# Patient Record
Sex: MALE | Race: BLACK OR AFRICAN AMERICAN | NOT HISPANIC OR LATINO | URBAN - METROPOLITAN AREA
[De-identification: names, ages, dates, MRNs, and addresses within clinical notes are randomized per-mention and may not be internally consistent; named-entity substitution may affect disease eponyms.]

---

## 2020-01-03 ENCOUNTER — EMERGENCY (EMERGENCY)
Facility: HOSPITAL | Age: 45
LOS: 0 days | Discharge: ANOTHER HOSPITAL | End: 2020-01-04
Attending: EMERGENCY MEDICINE
Payer: SELF-PAY

## 2020-01-03 DIAGNOSIS — R07.89 OTHER CHEST PAIN: ICD-10-CM

## 2020-01-03 DIAGNOSIS — J94.2 HEMOTHORAX: ICD-10-CM

## 2020-01-03 PROCEDURE — 93010 ELECTROCARDIOGRAM REPORT: CPT

## 2020-01-03 PROCEDURE — 99285 EMERGENCY DEPT VISIT HI MDM: CPT | Mod: 25

## 2020-01-03 PROCEDURE — 99053 MED SERV 10PM-8AM 24 HR FAC: CPT

## 2020-01-03 PROCEDURE — 32551 INSERTION OF CHEST TUBE: CPT

## 2020-01-04 ENCOUNTER — INPATIENT (INPATIENT)
Facility: HOSPITAL | Age: 45
LOS: 1 days | Discharge: HOME | End: 2020-01-06
Attending: THORACIC SURGERY (CARDIOTHORACIC VASCULAR SURGERY) | Admitting: THORACIC SURGERY (CARDIOTHORACIC VASCULAR SURGERY)
Payer: SELF-PAY

## 2020-01-04 VITALS
SYSTOLIC BLOOD PRESSURE: 135 MMHG | TEMPERATURE: 98 F | HEIGHT: 76 IN | HEART RATE: 87 BPM | OXYGEN SATURATION: 95 % | RESPIRATION RATE: 18 BRPM | DIASTOLIC BLOOD PRESSURE: 96 MMHG | WEIGHT: 166.89 LBS

## 2020-01-04 VITALS
DIASTOLIC BLOOD PRESSURE: 77 MMHG | RESPIRATION RATE: 14 BRPM | SYSTOLIC BLOOD PRESSURE: 124 MMHG | OXYGEN SATURATION: 99 % | HEART RATE: 76 BPM

## 2020-01-04 VITALS
OXYGEN SATURATION: 97 % | HEART RATE: 79 BPM | RESPIRATION RATE: 20 BRPM | SYSTOLIC BLOOD PRESSURE: 136 MMHG | DIASTOLIC BLOOD PRESSURE: 86 MMHG | TEMPERATURE: 98 F

## 2020-01-04 DIAGNOSIS — J93.9 PNEUMOTHORAX, UNSPECIFIED: ICD-10-CM

## 2020-01-04 LAB
ALBUMIN SERPL ELPH-MCNC: 3.4 G/DL — SIGNIFICANT CHANGE UP (ref 3.3–5)
ALBUMIN SERPL ELPH-MCNC: 3.9 G/DL — SIGNIFICANT CHANGE UP (ref 3.3–5)
ALP SERPL-CCNC: 52 U/L — SIGNIFICANT CHANGE UP (ref 40–120)
ALP SERPL-CCNC: 54 U/L — SIGNIFICANT CHANGE UP (ref 40–120)
ALT FLD-CCNC: 10 U/L — SIGNIFICANT CHANGE UP (ref 4–41)
ALT FLD-CCNC: 16 U/L — SIGNIFICANT CHANGE UP (ref 12–78)
ANION GAP SERPL CALC-SCNC: 13 MMO/L — SIGNIFICANT CHANGE UP (ref 7–14)
ANION GAP SERPL CALC-SCNC: 5 MMOL/L — SIGNIFICANT CHANGE UP (ref 5–17)
APTT BLD: 30.8 SEC — SIGNIFICANT CHANGE UP (ref 27.5–36.3)
APTT BLD: 32.6 SEC — SIGNIFICANT CHANGE UP (ref 27.5–36.3)
AST SERPL-CCNC: 15 U/L — SIGNIFICANT CHANGE UP (ref 15–37)
AST SERPL-CCNC: 28 U/L — SIGNIFICANT CHANGE UP (ref 4–40)
BASOPHILS # BLD AUTO: 0.04 K/UL — SIGNIFICANT CHANGE UP (ref 0–0.2)
BASOPHILS # BLD AUTO: 0.06 K/UL — SIGNIFICANT CHANGE UP (ref 0–0.2)
BASOPHILS NFR BLD AUTO: 0.3 % — SIGNIFICANT CHANGE UP (ref 0–2)
BASOPHILS NFR BLD AUTO: 0.7 % — SIGNIFICANT CHANGE UP (ref 0–2)
BILIRUB SERPL-MCNC: 0.6 MG/DL — SIGNIFICANT CHANGE UP (ref 0.2–1.2)
BILIRUB SERPL-MCNC: 0.7 MG/DL — SIGNIFICANT CHANGE UP (ref 0.2–1.2)
BUN SERPL-MCNC: 15 MG/DL — SIGNIFICANT CHANGE UP (ref 7–23)
BUN SERPL-MCNC: 17 MG/DL — SIGNIFICANT CHANGE UP (ref 7–23)
CALCIUM SERPL-MCNC: 8.7 MG/DL — SIGNIFICANT CHANGE UP (ref 8.5–10.1)
CALCIUM SERPL-MCNC: 8.9 MG/DL — SIGNIFICANT CHANGE UP (ref 8.4–10.5)
CHLORIDE SERPL-SCNC: 102 MMOL/L — SIGNIFICANT CHANGE UP (ref 98–107)
CHLORIDE SERPL-SCNC: 106 MMOL/L — SIGNIFICANT CHANGE UP (ref 96–108)
CO2 SERPL-SCNC: 23 MMOL/L — SIGNIFICANT CHANGE UP (ref 22–31)
CO2 SERPL-SCNC: 27 MMOL/L — SIGNIFICANT CHANGE UP (ref 22–31)
CREAT SERPL-MCNC: 1.05 MG/DL — SIGNIFICANT CHANGE UP (ref 0.5–1.3)
CREAT SERPL-MCNC: 1.14 MG/DL — SIGNIFICANT CHANGE UP (ref 0.5–1.3)
D DIMER BLD IA.RAPID-MCNC: 177 NG/ML DDU — SIGNIFICANT CHANGE UP
EOSINOPHIL # BLD AUTO: 0.1 K/UL — SIGNIFICANT CHANGE UP (ref 0–0.5)
EOSINOPHIL # BLD AUTO: 0.18 K/UL — SIGNIFICANT CHANGE UP (ref 0–0.5)
EOSINOPHIL NFR BLD AUTO: 0.7 % — SIGNIFICANT CHANGE UP (ref 0–6)
EOSINOPHIL NFR BLD AUTO: 2 % — SIGNIFICANT CHANGE UP (ref 0–6)
GLUCOSE SERPL-MCNC: 70 MG/DL — SIGNIFICANT CHANGE UP (ref 70–99)
GLUCOSE SERPL-MCNC: 88 MG/DL — SIGNIFICANT CHANGE UP (ref 70–99)
HCT VFR BLD CALC: 37.6 % — LOW (ref 39–50)
HCT VFR BLD CALC: 43.1 % — SIGNIFICANT CHANGE UP (ref 39–50)
HGB BLD-MCNC: 12.6 G/DL — LOW (ref 13–17)
HGB BLD-MCNC: 13.9 G/DL — SIGNIFICANT CHANGE UP (ref 13–17)
IMM GRANULOCYTES NFR BLD AUTO: 0.2 % — SIGNIFICANT CHANGE UP (ref 0–1.5)
IMM GRANULOCYTES NFR BLD AUTO: 0.2 % — SIGNIFICANT CHANGE UP (ref 0–1.5)
INR BLD: 1.14 RATIO — SIGNIFICANT CHANGE UP (ref 0.88–1.16)
INR BLD: 1.14 — SIGNIFICANT CHANGE UP (ref 0.88–1.17)
LYMPHOCYTES # BLD AUTO: 1.62 K/UL — SIGNIFICANT CHANGE UP (ref 1–3.3)
LYMPHOCYTES # BLD AUTO: 11.4 % — LOW (ref 13–44)
LYMPHOCYTES # BLD AUTO: 2.88 K/UL — SIGNIFICANT CHANGE UP (ref 1–3.3)
LYMPHOCYTES # BLD AUTO: 32.6 % — SIGNIFICANT CHANGE UP (ref 13–44)
MCHC RBC-ENTMCNC: 27.7 PG — SIGNIFICANT CHANGE UP (ref 27–34)
MCHC RBC-ENTMCNC: 28.5 PG — SIGNIFICANT CHANGE UP (ref 27–34)
MCHC RBC-ENTMCNC: 32.3 % — SIGNIFICANT CHANGE UP (ref 32–36)
MCHC RBC-ENTMCNC: 33.5 GM/DL — SIGNIFICANT CHANGE UP (ref 32–36)
MCV RBC AUTO: 85.1 FL — SIGNIFICANT CHANGE UP (ref 80–100)
MCV RBC AUTO: 86 FL — SIGNIFICANT CHANGE UP (ref 80–100)
MONOCYTES # BLD AUTO: 1.03 K/UL — HIGH (ref 0–0.9)
MONOCYTES # BLD AUTO: 1.4 K/UL — HIGH (ref 0–0.9)
MONOCYTES NFR BLD AUTO: 15.8 % — HIGH (ref 2–14)
MONOCYTES NFR BLD AUTO: 7.2 % — SIGNIFICANT CHANGE UP (ref 2–14)
NEUTROPHILS # BLD AUTO: 11.41 K/UL — HIGH (ref 1.8–7.4)
NEUTROPHILS # BLD AUTO: 4.3 K/UL — SIGNIFICANT CHANGE UP (ref 1.8–7.4)
NEUTROPHILS NFR BLD AUTO: 48.7 % — SIGNIFICANT CHANGE UP (ref 43–77)
NEUTROPHILS NFR BLD AUTO: 80.2 % — HIGH (ref 43–77)
NRBC # BLD: 0 /100 WBCS — SIGNIFICANT CHANGE UP (ref 0–0)
NRBC # FLD: 0 K/UL — SIGNIFICANT CHANGE UP (ref 0–0)
PLATELET # BLD AUTO: 167 K/UL — SIGNIFICANT CHANGE UP (ref 150–400)
PLATELET # BLD AUTO: 171 K/UL — SIGNIFICANT CHANGE UP (ref 150–400)
PMV BLD: 12.1 FL — SIGNIFICANT CHANGE UP (ref 7–13)
POTASSIUM SERPL-MCNC: 4.2 MMOL/L — SIGNIFICANT CHANGE UP (ref 3.5–5.3)
POTASSIUM SERPL-MCNC: 4.7 MMOL/L — SIGNIFICANT CHANGE UP (ref 3.5–5.3)
POTASSIUM SERPL-SCNC: 4.2 MMOL/L — SIGNIFICANT CHANGE UP (ref 3.5–5.3)
POTASSIUM SERPL-SCNC: 4.7 MMOL/L — SIGNIFICANT CHANGE UP (ref 3.5–5.3)
PROT SERPL-MCNC: 7.2 GM/DL — SIGNIFICANT CHANGE UP (ref 6–8.3)
PROT SERPL-MCNC: 7.3 G/DL — SIGNIFICANT CHANGE UP (ref 6–8.3)
PROTHROM AB SERPL-ACNC: 12.7 SEC — SIGNIFICANT CHANGE UP (ref 9.8–13.1)
PROTHROM AB SERPL-ACNC: 12.8 SEC — SIGNIFICANT CHANGE UP (ref 10–12.9)
RBC # BLD: 4.42 M/UL — SIGNIFICANT CHANGE UP (ref 4.2–5.8)
RBC # BLD: 5.01 M/UL — SIGNIFICANT CHANGE UP (ref 4.2–5.8)
RBC # FLD: 13.1 % — SIGNIFICANT CHANGE UP (ref 10.3–14.5)
RBC # FLD: 13.2 % — SIGNIFICANT CHANGE UP (ref 10.3–14.5)
SODIUM SERPL-SCNC: 138 MMOL/L — SIGNIFICANT CHANGE UP (ref 135–145)
SODIUM SERPL-SCNC: 138 MMOL/L — SIGNIFICANT CHANGE UP (ref 135–145)
TROPONIN I SERPL-MCNC: <.015 NG/ML — SIGNIFICANT CHANGE UP (ref 0.01–0.04)
WBC # BLD: 14.23 K/UL — HIGH (ref 3.8–10.5)
WBC # BLD: 8.84 K/UL — SIGNIFICANT CHANGE UP (ref 3.8–10.5)
WBC # FLD AUTO: 14.23 K/UL — HIGH (ref 3.8–10.5)
WBC # FLD AUTO: 8.84 K/UL — SIGNIFICANT CHANGE UP (ref 3.8–10.5)

## 2020-01-04 PROCEDURE — 99223 1ST HOSP IP/OBS HIGH 75: CPT

## 2020-01-04 PROCEDURE — 71045 X-RAY EXAM CHEST 1 VIEW: CPT | Mod: 26,76

## 2020-01-04 PROCEDURE — 71275 CT ANGIOGRAPHY CHEST: CPT | Mod: 26

## 2020-01-04 PROCEDURE — 71045 X-RAY EXAM CHEST 1 VIEW: CPT | Mod: 26,77

## 2020-01-04 RX ORDER — PANTOPRAZOLE SODIUM 20 MG/1
40 TABLET, DELAYED RELEASE ORAL
Refills: 0 | Status: DISCONTINUED | OUTPATIENT
Start: 2020-01-04 | End: 2020-01-06

## 2020-01-04 RX ORDER — OXYCODONE HYDROCHLORIDE 5 MG/1
10 TABLET ORAL EVERY 4 HOURS
Refills: 0 | Status: DISCONTINUED | OUTPATIENT
Start: 2020-01-04 | End: 2020-01-06

## 2020-01-04 RX ORDER — SENNA PLUS 8.6 MG/1
2 TABLET ORAL AT BEDTIME
Refills: 0 | Status: DISCONTINUED | OUTPATIENT
Start: 2020-01-04 | End: 2020-01-06

## 2020-01-04 RX ORDER — OXYCODONE HYDROCHLORIDE 5 MG/1
5 TABLET ORAL EVERY 4 HOURS
Refills: 0 | Status: DISCONTINUED | OUTPATIENT
Start: 2020-01-04 | End: 2020-01-06

## 2020-01-04 RX ORDER — HEPARIN SODIUM 5000 [USP'U]/ML
5000 INJECTION INTRAVENOUS; SUBCUTANEOUS EVERY 8 HOURS
Refills: 0 | Status: DISCONTINUED | OUTPATIENT
Start: 2020-01-04 | End: 2020-01-06

## 2020-01-04 RX ORDER — IBUPROFEN 200 MG
600 TABLET ORAL EVERY 6 HOURS
Refills: 0 | Status: DISCONTINUED | OUTPATIENT
Start: 2020-01-04 | End: 2020-01-04

## 2020-01-04 RX ORDER — ACETAMINOPHEN 500 MG
975 TABLET ORAL EVERY 6 HOURS
Refills: 0 | Status: DISCONTINUED | OUTPATIENT
Start: 2020-01-04 | End: 2020-01-06

## 2020-01-04 RX ORDER — METHOCARBAMOL 500 MG/1
750 TABLET, FILM COATED ORAL ONCE
Refills: 0 | Status: COMPLETED | OUTPATIENT
Start: 2020-01-04 | End: 2020-01-04

## 2020-01-04 RX ORDER — INFLUENZA VIRUS VACCINE 15; 15; 15; 15 UG/.5ML; UG/.5ML; UG/.5ML; UG/.5ML
0.5 SUSPENSION INTRAMUSCULAR ONCE
Refills: 0 | Status: DISCONTINUED | OUTPATIENT
Start: 2020-01-04 | End: 2020-01-06

## 2020-01-04 RX ORDER — MORPHINE SULFATE 50 MG/1
4 CAPSULE, EXTENDED RELEASE ORAL ONCE
Refills: 0 | Status: DISCONTINUED | OUTPATIENT
Start: 2020-01-04 | End: 2020-01-04

## 2020-01-04 RX ORDER — KETOROLAC TROMETHAMINE 30 MG/ML
15 SYRINGE (ML) INJECTION ONCE
Refills: 0 | Status: DISCONTINUED | OUTPATIENT
Start: 2020-01-04 | End: 2020-01-04

## 2020-01-04 RX ORDER — FENTANYL CITRATE 50 UG/ML
50 INJECTION INTRAVENOUS ONCE
Refills: 0 | Status: DISCONTINUED | OUTPATIENT
Start: 2020-01-04 | End: 2020-01-04

## 2020-01-04 RX ORDER — SODIUM CHLORIDE 9 MG/ML
1000 INJECTION INTRAMUSCULAR; INTRAVENOUS; SUBCUTANEOUS ONCE
Refills: 0 | Status: COMPLETED | OUTPATIENT
Start: 2020-01-04 | End: 2020-01-04

## 2020-01-04 RX ADMIN — HEPARIN SODIUM 5000 UNIT(S): 5000 INJECTION INTRAVENOUS; SUBCUTANEOUS at 15:00

## 2020-01-04 RX ADMIN — FENTANYL CITRATE 50 MICROGRAM(S): 50 INJECTION INTRAVENOUS at 06:10

## 2020-01-04 RX ADMIN — Medication 15 MILLIGRAM(S): at 01:58

## 2020-01-04 RX ADMIN — MORPHINE SULFATE 4 MILLIGRAM(S): 50 CAPSULE, EXTENDED RELEASE ORAL at 10:42

## 2020-01-04 RX ADMIN — SODIUM CHLORIDE 1000 MILLILITER(S): 9 INJECTION INTRAMUSCULAR; INTRAVENOUS; SUBCUTANEOUS at 06:15

## 2020-01-04 RX ADMIN — METHOCARBAMOL 750 MILLIGRAM(S): 500 TABLET, FILM COATED ORAL at 01:58

## 2020-01-04 RX ADMIN — SODIUM CHLORIDE 1000 MILLILITER(S): 9 INJECTION INTRAMUSCULAR; INTRAVENOUS; SUBCUTANEOUS at 07:04

## 2020-01-04 RX ADMIN — OXYCODONE HYDROCHLORIDE 10 MILLIGRAM(S): 5 TABLET ORAL at 23:49

## 2020-01-04 RX ADMIN — MORPHINE SULFATE 4 MILLIGRAM(S): 50 CAPSULE, EXTENDED RELEASE ORAL at 07:04

## 2020-01-04 RX ADMIN — FENTANYL CITRATE 50 MICROGRAM(S): 50 INJECTION INTRAVENOUS at 07:04

## 2020-01-04 RX ADMIN — Medication 15 MILLIGRAM(S): at 02:28

## 2020-01-04 RX ADMIN — OXYCODONE HYDROCHLORIDE 10 MILLIGRAM(S): 5 TABLET ORAL at 22:49

## 2020-01-04 RX ADMIN — HEPARIN SODIUM 5000 UNIT(S): 5000 INJECTION INTRAVENOUS; SUBCUTANEOUS at 21:43

## 2020-01-04 RX ADMIN — MORPHINE SULFATE 4 MILLIGRAM(S): 50 CAPSULE, EXTENDED RELEASE ORAL at 06:25

## 2020-01-04 NOTE — H&P ADULT - NSHPREVIEWOFSYSTEMS_GEN_ALL_CORE
REVIEW OF SYSTEMS:  CONSTITUTIONAL: No fever, weight loss, or fatigue  EYES: No eye pain, visual disturbances, or discharge  ENMT:  No tinnitus, vertigo, dysphagia  RESPIRATORY: No cough, SOB, wheezing, chills or hemoptysis  CARDIOVASCULAR: Having pleuritic chest pain. No palpitations, dizziness, or leg swelling  GASTROINTESTINAL: No abdominal pain. No nausea, vomiting, or diarrhea.  GENITOURINARY: No dysuria, frequency, hematuria, or incontinence.  NEUROLOGICAL: No headaches, memory loss, loss of strength, numbness, or tremors  SKIN: No itching, burning, rashes, or lesions   ENDOCRINE: No heat or cold intolerance  MUSCULOSKELETAL: No joint pain or swelling

## 2020-01-04 NOTE — H&P ADULT - ASSESSMENT
45 y/o male, no significant medical history besides current every day smoker, presents from [facility] to ED transfer for management of left pneumothorax. His tube is to -20 suction with no air leak and pain has improved since insertion. He hasn't been SOB. dizzy, or lightheaded. His labs show leukocytosis but probably elevated in setting of viral infection. His labs are otherwise within normal limits. His imaging shows small residual pneumothorax but otherwise well expanded lung from previous imaging. He is currently hemodynamically stable and afebrile.       Plan:    -Admit to thoracic surgery per Dr. Monroe  -CT to -20 suction, observe for air leak  -F/u AM CXR   -Regular diet  -Nicotine patch if signs of tobacco withdrawal  on smoking cessation  -DVT ppx: Heparin      Plan discussed with Dr. Monroe

## 2020-01-04 NOTE — ED ADULT NURSE REASSESSMENT NOTE - NS ED NURSE REASSESS COMMENT FT1
Patient was received from [facility]. at 0710, a/o x4 in bed 17 with left sided size 8 chest tube in place and connect to pleuravac drain with low suction. Denies pains or sob, no drainage in tube. Patient is NSR on cardiac monitor. Pending transfer to [facility] ED. Patient and wife Diandra Leahy aware.
chest tube insertion into L-chest wall by dr. Shea

## 2020-01-04 NOTE — ED ADULT TRIAGE NOTE - CHIEF COMPLAINT QUOTE
pt Transferred from [place] for Pneumothorax pt with lefts side Chest tube in place. Fr. 14 to suction, pt stated had cold few days ago and the last 3-4 days have been coughing, denies trauma,   Hx. Smoking.

## 2020-01-04 NOTE — ED PROVIDER NOTE - CLINICAL SUMMARY MEDICAL DECISION MAKING FREE TEXT BOX
Patient w/chest pain, s/p large left sided pneumothorax.  VSS.  Cook catheter 8Fr inserted for lung reexpansion, patient tolerated procedure well.  Patient to be transferred to [facility] for ongoing management by Thoracic surgery.  Patient consents to transfer.

## 2020-01-04 NOTE — H&P ADULT - NSHPPHYSICALEXAM_GEN_ALL_CORE
Gen: NAD, WN/WD  HEENT: No scleral icterus, PERRLA/EOMI, hearing grossly intact  Neck: Supple, trachea midline, no thyromegaly, no JVD  Lung: CTAB, no wheezes/rhonchi/rales  Heart: RRR, no murmurs/rubs/gallops  GI: Soft, non-tender, non-distended  Extrem: WWP, no edema, palpable pulses bilaterally  Chest tube: L CT to -20 suction, no air leak

## 2020-01-04 NOTE — H&P ADULT - NSHPLABSRESULTS_GEN_ALL_CORE
13.9   14.23 )-----------( 167      ( 04 Jan 2020 10:34 )               43.1       01-04    138  |  102  |  17  ----------------------------<  70  4.7   |  23  |  1.14    Ca    8.9      04 Jan 2020 10:34    TPro  7.3  /  Alb  3.9  /  TBili  0.6  /  DBili  x   /  AST  28  /  ALT  10  /  AlkPhos  52  01-04      PT/INR - ( 04 Jan 2020 10:34 )   PT: 12.7 SEC;   INR: 1.14          PTT - ( 04 Jan 2020 10:34 )  PTT:30.8 SEC          RADIOLOGY:    Xray Chest 1 View- PORTABLE-Urgent (01.04.20 @ 11:37)     FINDINGS:    LUNGS: Left chest tube. Patchy left lower lung opacity.    PLEURA: Small left apical pneumothorax. No pleural effusion.    HEART AND MEDIASTINUM: Heart size is within normal limits.    SKELETON: Unremarkable skeletal structures.      IMPRESSION:     Small left apical pneumothorax status post left chest tube.      CT Chest (1/4): Collapsed left lung and right apical blebs. No pleural effusions

## 2020-01-04 NOTE — ED PROVIDER NOTE - OBJECTIVE STATEMENT
Pertinent PMH/PSH/FHx/SHx and Review of Systems contained within:  Patient presents to the ED for chest pain.  Patient reports substernal chest pain for the last 2 days, continuous, nonradiating, pain worse on movement and deep breathing, patient endorses mild sob.  He took ibuprofen at home without relief.  He does endorse recent history of physical labor.  Denies cough, hemoptysis, leg swelling, fever, recent immobility, dizziness, syncope.  +Smoker, denies use of other illict drugs or h/o alchol abuse.  He denies family h/o heart disease.     ROS: No fever/chills, No headache/photophobia/eye pain/changes in vision, No ear pain/sore throat/dysphagia, No palpitations, no SOB/cough/wheeze/stridor, No abdominal pain, No N/V/D/melena, no dysuria/frequency/discharge, No neck/back pain, no rash, no changes in neurological status/function.

## 2020-01-04 NOTE — ED ADULT NURSE NOTE - OBJECTIVE STATEMENT
rec'd pt aaox3 in room 14 as a transfer from [facility] for CT surgery consult, pt. dx w/ a pneumothorax, arrives w/ a 14 Fr chest tube in place, connected to wall suction by EMS at 80 mmHg, 5 mL's of serosanguinous fluid noted in the pleura vac collection chamber, pt. c/o 4/10 discomfort at the chest tube insertion site. Pt. also arrives w/ bilateral 20G IV saline locks in the AC's, no medication/fluids running at the present time. Pt. reports a productive cough w/ yellow sputum x 3 days, subjective fevers, states he developed L sided chest/abdominal pain yesterday, predominantly w/ movement. Denies shortness of breath, appears comfortable, respirations even and unlabored. SPO2 99% on 2L via nasal canula. Labs drawn and sent as ordered. Pt. medicated as ordered. Awaiting results and dispo.

## 2020-01-04 NOTE — ED ADULT NURSE NOTE - OBJECTIVE STATEMENT
pt c/o chest pain x 2 days, worse on movement and on cough.  pt c/o cough with yellow phlegm.  pt denies SOB

## 2020-01-04 NOTE — ED PROVIDER NOTE - CLINICAL SUMMARY MEDICAL DECISION MAKING FREE TEXT BOX
[facility] PGY2- 43 yo M presents with Left lung pneumothorax s/p chest tube placement. Will obtain CXR and basic labs. Anticipate admission.

## 2020-01-04 NOTE — ED PROVIDER NOTE - NS ED ROS FT
CONSTITUTIONAL: No fever, weight loss, or fatigue  EYES: No eye pain, visual disturbances, or discharge  ENMT: No difficulty hearing, tinnitus, vertigo; No sinus or throat pain  RESPIRATORY: + cough, shortness of breath  CARDIOVASCULAR: + chest pain  GASTROINTESTINAL: No abdominal or epigastric pain. No nausea, vomiting, or hematemesis; No diarrhea or constipation. No melena or hematochezia.  GENITOURINARY: No dysuria, frequency, hematuria, or incontinence  NEUROLOGICAL: No headaches, loss of strength, numbness, or tremors  SKIN: No itching, burning, rashes, or lesions   LYMPH NODES: No enlarged glands  ENDOCRINE: No heat or cold intolerance; No polydipsia or polyuria  MUSCULOSKELETAL: No joint pain or swelling;   PSYCHIATRIC: Denies depression, anxiety  HEME/LYMPH: No easy bruising, or bleeding gums  ALLERGY AND IMMUNOLOGIC: No hives or eczema

## 2020-01-04 NOTE — ED PROVIDER NOTE - PHYSICAL EXAMINATION
PHYSICAL EXAM:  GENERAL: NAD  HEAD:  Atraumatic, Normocephalic  EYES: EOMI, conjunctiva and sclera clear  ENMT: No tonsillar erythema, exudates, or enlargement; dry mucous membranes, Good dentition  NECK: Supple, No JVD  NERVOUS SYSTEM: AOX3, motor and sensation grossly intact in b/l UE and b/l LE  PSYCHIATRIC: Appropriate affect and mood  CHEST/LUNG: Diminished Left lower lung breath sounds. Left chest tube in place   HEART: Regular rate and rhythm; No murmurs, rubs, or gallops. No LE edema  ABDOMEN: Soft, Nontender, Nondistended; Bowel sounds present  EXTREMITIES:  2+ Peripheral Pulses, No clubbing, cyanosis  SKIN: No rashes or lesions

## 2020-01-04 NOTE — ED ADULT NURSE NOTE - NSIMPLEMENTINTERV_GEN_ALL_ED
Implemented All Universal Safety Interventions:  [place] to call system. Call bell, personal items and telephone within reach. Instruct patient to call for assistance. Room bathroom lighting operational. Non-slip footwear when patient is off stretcher. Physically safe environment: no spills, clutter or unnecessary equipment. Stretcher in lowest position, wheels locked, appropriate side rails in place.

## 2020-01-04 NOTE — ED ADULT NURSE NOTE - CHIEF COMPLAINT QUOTE
pt Transferred from [place] for Pneumothorax pt with lefts side Chest tube in place. Fr. 14 to suction, pt stated had cold few days ago and the last 3-4 days have been coughing, denies trauma,   Hx. Smoking.

## 2020-01-04 NOTE — ED PROVIDER NOTE - PHYSICAL EXAMINATION
Gen: Alert, NAD, well appearing, chest pain on movement  Head: NC, AT, PERRL, EOMI, normal lids/conjunctiva  ENT: normal hearing, patent oropharynx without erythema/exudate, uvula midline  Neck: +supple, no tenderness/meningismus/JVD, +Trachea midline  Pulm: Bilateral BS, normal resp effort, no wheeze/stridor/retractions  CV: RRR, no M/R/G, +dist pulses  Abd: soft, NT/ND, Negative [place] signs, +BS, no palpable masses  Mskel: no edema/erythema/cyanosis  Skin: no rash, warm/dry  Neuro: AAOx3, no apparent sensory/motor deficits, coordination intact

## 2020-01-04 NOTE — H&P ADULT - HISTORY OF PRESENT ILLNESS
HPI: 45 y/o male, no significant medical history besides current every day smoker, presents from [place] ED to ED transfer for management of left pneumothorax. Patient has been having cold-like symptoms (cough, malaise, fevers) for the past few days and had sudden onset sharp chest pain after coughing. He denies any SOB, dizziness, or lightheadedness after experiencing the pain. He presented to [facility] ED at which point chest xray showed large left pneumothorax and Chest CT is also obtained demonstrating pleural blebs on the right lung and collapsed left lung. 8 Fr Cook Catheter was inserted and follow up chest xray showed small residual apical left pneumothorax. At this point patient was transferred to [facility] for further management of left pneumothorax.

## 2020-01-04 NOTE — ED ADULT TRIAGE NOTE - CHIEF COMPLAINT QUOTE
Reports Chest pain  , worse with activities , onset two days , cough , dry since yesterday . denies fever, denies medical problem

## 2020-01-04 NOTE — ED PROVIDER NOTE - OBJECTIVE STATEMENT
45 yo M no PMHx presents transferred from [facility] ED for chest tube management. He presented to [facility] ED with 3 days of cough and chest pain. CXR and CTA showed large left pneumothorax; no PE. 8Fr chest tube placed with evidence of reexpansion on post procedural chest x-ray. Transferred to [facility] for further management.

## 2020-01-04 NOTE — H&P ADULT - NSHPSOCIALHISTORY_GEN_ALL_CORE
Works as a   Lives w/ people at home  Current 1/2 ppd smoker for the last 2 years  Social alcohol drinker  No illicit drug use

## 2020-01-05 PROCEDURE — 71045 X-RAY EXAM CHEST 1 VIEW: CPT | Mod: 26,77

## 2020-01-05 PROCEDURE — 71045 X-RAY EXAM CHEST 1 VIEW: CPT | Mod: 26

## 2020-01-05 PROCEDURE — 99232 SBSQ HOSP IP/OBS MODERATE 35: CPT

## 2020-01-05 RX ADMIN — HEPARIN SODIUM 5000 UNIT(S): 5000 INJECTION INTRAVENOUS; SUBCUTANEOUS at 13:00

## 2020-01-05 RX ADMIN — HEPARIN SODIUM 5000 UNIT(S): 5000 INJECTION INTRAVENOUS; SUBCUTANEOUS at 21:13

## 2020-01-05 RX ADMIN — OXYCODONE HYDROCHLORIDE 5 MILLIGRAM(S): 5 TABLET ORAL at 19:50

## 2020-01-05 RX ADMIN — OXYCODONE HYDROCHLORIDE 5 MILLIGRAM(S): 5 TABLET ORAL at 19:06

## 2020-01-05 RX ADMIN — HEPARIN SODIUM 5000 UNIT(S): 5000 INJECTION INTRAVENOUS; SUBCUTANEOUS at 05:08

## 2020-01-05 RX ADMIN — PANTOPRAZOLE SODIUM 40 MILLIGRAM(S): 20 TABLET, DELAYED RELEASE ORAL at 05:07

## 2020-01-05 NOTE — DISCHARGE NOTE PROVIDER - NSDCMRMEDTOKEN_GEN_ALL_CORE_FT
acetaminophen 325 mg oral tablet: 2 tab(s) orally every 6 hours, As Needed -Mild Pain (1 - 3)   Colace 100 mg oral capsule: 1 cap(s) orally every 8 hours, As Needed- for constipation   Oxaydo 5 mg oral tablet: 1 tab(s) orally every 6 hours, As Needed -for severe pain MDD:4

## 2020-01-05 NOTE — DISCHARGE NOTE PROVIDER - CARE PROVIDER_API CALL
Puneet Monroe)  Surgery; Thoracic Surgery  [address] [place] [address], [place] [address]  Phone: [phone]  Fax: [phone]  Follow Up Time:

## 2020-01-05 NOTE — DISCHARGE NOTE PROVIDER - HOSPITAL COURSE
This 44 year old male current everyday smoker with no significant medical history was transferred from the [place] ED for management of a left-sided pneumothorax. He had been having cold-like symptoms for a few days before developing sudden-onset sharp chest pain after coughing.   A CXR in the ED showed a large left-sided pneumothorax and a Cook Catheter was inserted.  A follow up CXR showed a small residual apical L-sided PTX and the pt was sent to [facility].  On 1/5 the pt passed a WS trial and was for discharge after chest tube removal. This 44 year old male current everyday smoker with no significant medical history was transferred from the [place] ED for management of a left-sided pneumothorax. He had been having cold-like symptoms for a few days before developing sudden-onset sharp chest pain after coughing.   A CXR in the ED showed a large left-sided pneumothorax and a Cook Catheter was inserted.  A follow up CXR showed a small residual apical L-sided PTX and the pt was sent to [facility].  On 1/5 the pt passed a WS trial. Chest tube removed on 1/6/19. Small stable left sided pneumothoax was present on CXR, read was discussed with Dr. Gunter. Patient on Room air, no acute complaints. Patient was informed of signs and symptoms of having a recurrent pneumothorax, was informed that if these symptoms do occur that he would immediately seek medical attention. Patient verbalized understanding of instructions. Patient ready for discharge home. Follow up CXR script in patient's discharge folder.

## 2020-01-05 NOTE — DISCHARGE NOTE PROVIDER - NSDCACTIVITY_GEN_ALL_CORE
Walking - Outdoors allowed/Sex allowed/Showering allowed/Walking - Indoors allowed/No heavy lifting/straining/Do not make important decisions/Stairs allowed/Return to Work/School allowed

## 2020-01-05 NOTE — PROGRESS NOTE ADULT - SUBJECTIVE AND OBJECTIVE BOX
Subjective: 45 y/o male presents sitting up in bed, no acute events overnight. Patient c/o "some pain near the chest tube and cough". Otherwise denies sob, CP, NVD.     Chest tube is in place to suction no air leak noted.     Vital Signs:  Vital Signs Last 24 Hrs  T(C): 36.8 (01-05-20 @ 05:03), Max: 37.7 (01-04-20 @ 20:11)  T(F): 98.2 (01-05-20 @ 05:03), Max: 99.9 (01-04-20 @ 20:11)  HR: 81 (01-05-20 @ 05:03) (78 - 109)  BP: 131/92 (01-05-20 @ 05:03) (115/70 - 142/82)  RR: 17 (01-05-20 @ 05:03) (17 - 26)  SpO2: 92% (01-05-20 @ 05:03) (92% - 99%) on (O2)    Pertinent Physical Exam:  Telemetry/Alarms: NSR 80-90s  General: WN/WD NAD  Neurology: Awake, nonfocal, MARTEL x 4  Eyes: Scleras clear, PERRLA/ EOMI, Gross vision intact  ENT:Gross hearing intact, grossly patent pharynx, no stridor  Neck: Neck supple, trachea midline, No JVD,   Respiratory: CTA B/L, No wheezing, rales, rhonchi  CV: RRR, S1S2, no murmurs, rubs or gallops  Abdominal: Soft, NT, ND +BS,   Extremities: No edema, + peripheral pulses  Skin: No Rashes, Hematoma, Ecchymosis  Lymphatic: No Neck, axilla, groin LAD  Psych: Oriented x 3, normal affect  Tubes: Left chest tube placed at outside hospital in place    Chest Tube: Left Side    Air Leak: Yes[] / No[x]    Drainage: 80cc    I&O's Summary    04 Jan 2020 07:01  -  05 Jan 2020 07:00  --------------------------------------------------------  IN: 480 mL / OUT: 1130 mL / NET: -650 mL        Relevant labs, radiology and Medications reviewed  < from: Xray Chest 1 View- PORTABLE-Urgent (01.04.20 @ 11:37) >    IMPRESSION:     Small left apical pneumothorax status post left chest tube.    < end of copied text >                              13.9   14.23 )-----------( 167      ( 04 Jan 2020 10:34 )             43.1     01-04    138  |  102  |  17  ----------------------------<  70  4.7   |  23  |  1.14    Ca    8.9      04 Jan 2020 10:34    TPro  7.3  /  Alb  3.9  /  TBili  0.6  /  DBili  x   /  AST  28  /  ALT  10  /  AlkPhos  52  01-04    PT/INR - ( 04 Jan 2020 10:34 )   PT: 12.7 SEC;   INR: 1.14          PTT - ( 04 Jan 2020 10:34 )  PTT:30.8 SEC  MEDICATIONS  (STANDING):  heparin  Injectable 5000 Unit(s) SubCutaneous every 8 hours  influenza   Vaccine 0.5 milliLiter(s) IntraMuscular once  pantoprazole    Tablet 40 milliGRAM(s) Oral before breakfast    MEDICATIONS  (PRN):  acetaminophen   Tablet .. 975 milliGRAM(s) Oral every 6 hours PRN Mild Pain (1 - 3)  oxyCODONE    IR 10 milliGRAM(s) Oral every 4 hours PRN Severe Pain (7 - 10)  oxyCODONE    IR 5 milliGRAM(s) Oral every 4 hours PRN Moderate Pain (4 - 6)  senna 2 Tablet(s) Oral at bedtime PRN Constipation      Assessment  45 y/o male, no significant medical history besides current every day smoker, presents from [place] ED to ED transfer for management of left pneumothorax. His tube is to -20 suction with no air leak and pain has improved since insertion. He hasn't been SOB. dizzy, or lightheaded. His labs show leukocytosis but probably elevated in setting of viral infection. His labs are otherwise within normal limits. His imaging shows small residual pneumothorax but otherwise well expanded lung from previous imaging. He is currently hemodynamically stable and afebrile.     PLAN  Neuro: Pain management with PO pain medication as needed.  Pulm: Encourage coughing, deep breathing and use of incentive spirometry. Wean off supplemental oxygen as able. Daily CXR.   Cardio: Monitor telemetry/alarms  GI: Tolerating diet. Continue stool softeners.  Renal: monitor urine output, supplement electrolytes as needed  Vasc: Heparin SC/SCDs for DVT prophylaxis  Heme: Stable H/H.   ID: Off antibiotics. Stable.  Therapy: OOB/ambulate. Chest PT, encourage coughing  Tubes: Monitor Chest tube output. Maintain chest tube in place to suction  Disposition: Maintain chest tube to -20cm suction, consider increasing suction.  PTX unchanged. Repeat imaging in AM.  Discussed with Cardiothoracic Team at AM rounds. Subjective: 43 y/o male presents sitting up in bed, no acute events overnight. Patient c/o "some pain near the chest tube and cough". Otherwise denies sob, CP, NVD.     Chest tube is in place to suction no air leak noted.     Vital Signs:  Vital Signs Last 24 Hrs  T(C): 36.8 (01-05-20 @ 05:03), Max: 37.7 (01-04-20 @ 20:11)  T(F): 98.2 (01-05-20 @ 05:03), Max: 99.9 (01-04-20 @ 20:11)  HR: 81 (01-05-20 @ 05:03) (78 - 109)  BP: 131/92 (01-05-20 @ 05:03) (115/70 - 142/82)  RR: 17 (01-05-20 @ 05:03) (17 - 26)  SpO2: 92% (01-05-20 @ 05:03) (92% - 99%) on (O2)    Pertinent Physical Exam:  Telemetry/Alarms: NSR 80-90s  General: WN/WD NAD  Neurology: Awake, nonfocal, MARTEL x 4  Eyes: Scleras clear, PERRLA/ EOMI, Gross vision intact  ENT:Gross hearing intact, grossly patent pharynx, no stridor  Neck: Neck supple, trachea midline, No JVD,   Respiratory: CTA B/L, No wheezing, rales, rhonchi  CV: RRR, S1S2, no murmurs, rubs or gallops  Abdominal: Soft, NT, ND +BS,   Extremities: No edema, + peripheral pulses  Skin: No Rashes, Hematoma, Ecchymosis  Lymphatic: No Neck, axilla, groin LAD  Psych: Oriented x 3, normal affect  Tubes: Left chest tube placed at outside hospital in place    Chest Tube: Left Side    Air Leak: Yes[] / No[x]    Drainage: 80cc    I&O's Summary    04 Jan 2020 07:01  -  05 Jan 2020 07:00  --------------------------------------------------------  IN: 480 mL / OUT: 1130 mL / NET: -650 mL        Relevant labs, radiology and Medications reviewed  < from: Xray Chest 1 View- PORTABLE-Urgent (01.04.20 @ 11:37) >    IMPRESSION:     Small left apical pneumothorax status post left chest tube.    < end of copied text >                              13.9   14.23 )-----------( 167      ( 04 Jan 2020 10:34 )             43.1     01-04    138  |  102  |  17  ----------------------------<  70  4.7   |  23  |  1.14    Ca    8.9      04 Jan 2020 10:34    TPro  7.3  /  Alb  3.9  /  TBili  0.6  /  DBili  x   /  AST  28  /  ALT  10  /  AlkPhos  52  01-04    PT/INR - ( 04 Jan 2020 10:34 )   PT: 12.7 SEC;   INR: 1.14          PTT - ( 04 Jan 2020 10:34 )  PTT:30.8 SEC  MEDICATIONS  (STANDING):  heparin  Injectable 5000 Unit(s) SubCutaneous every 8 hours  influenza   Vaccine 0.5 milliLiter(s) IntraMuscular once  pantoprazole    Tablet 40 milliGRAM(s) Oral before breakfast    MEDICATIONS  (PRN):  acetaminophen   Tablet .. 975 milliGRAM(s) Oral every 6 hours PRN Mild Pain (1 - 3)  oxyCODONE    IR 10 milliGRAM(s) Oral every 4 hours PRN Severe Pain (7 - 10)  oxyCODONE    IR 5 milliGRAM(s) Oral every 4 hours PRN Moderate Pain (4 - 6)  senna 2 Tablet(s) Oral at bedtime PRN Constipation      Assessment  43 y/o male, no significant medical history besides current every day smoker, presents from [place] ED to ED transfer for management of left pneumothorax. His tube is to -20 suction with no air leak and pain has improved since insertion. He hasn't been SOB. dizzy, or lightheaded. His labs show leukocytosis but probably elevated in setting of viral infection. His labs are otherwise within normal limits. His imaging shows small residual pneumothorax but otherwise well expanded lung from previous imaging. He is currently hemodynamically stable and afebrile.     PLAN  Neuro: Pain management with PO pain medication as needed.  Pulm: Encourage coughing, deep breathing and use of incentive spirometry. Wean off supplemental oxygen as able. Daily CXR.   Cardio: Monitor telemetry/alarms  GI: Tolerating diet. Continue stool softeners.  Renal: monitor urine output, supplement electrolytes as needed  Vasc: Heparin SC/SCDs for DVT prophylaxis  Heme: Stable H/H.   ID: Off antibiotics. Stable.  Therapy: OOB/ambulate. Chest PT, encourage coughing  Tubes: Monitor Chest tube output. Maintain chest tube in place to suction  Disposition: Maintain chest tube to -20cm suction, consider WS trial.  PTX appears improved. Repeat imaging in AM.  Discussed with Cardiothoracic Team at AM rounds. Subjective: 45 y/o male presents sitting up in bed, no acute events overnight. Patient c/o "some pain near the chest tube and cough". Otherwise denies sob, CP, NVD.     Chest tube is in place to suction no air leak noted.     Vital Signs:  Vital Signs Last 24 Hrs  T(C): 36.8 (01-05-20 @ 05:03), Max: 37.7 (01-04-20 @ 20:11)  T(F): 98.2 (01-05-20 @ 05:03), Max: 99.9 (01-04-20 @ 20:11)  HR: 81 (01-05-20 @ 05:03) (78 - 109)  BP: 131/92 (01-05-20 @ 05:03) (115/70 - 142/82)  RR: 17 (01-05-20 @ 05:03) (17 - 26)  SpO2: 92% (01-05-20 @ 05:03) (92% - 99%) on (O2)    Pertinent Physical Exam:  Telemetry/Alarms: NSR 80-90s  General: WN/WD NAD  Neurology: Awake, nonfocal, MARTEL x 4  Eyes: Scleras clear, PERRLA/ EOMI, Gross vision intact  ENT:Gross hearing intact, grossly patent pharynx, no stridor  Neck: Neck supple, trachea midline, No JVD,   Respiratory: CTA B/L, No wheezing, rales, rhonchi  CV: RRR, S1S2, no murmurs, rubs or gallops  Abdominal: Soft, NT, ND +BS,   Extremities: No edema, + peripheral pulses  Skin: No Rashes, Hematoma, Ecchymosis  Lymphatic: No Neck, axilla, groin LAD  Psych: Oriented x 3, normal affect  Tubes: Left chest tube placed at outside hospital in place    Chest Tube: Left Side    Air Leak: Yes[] / No[x]    Drainage: 80cc    I&O's Summary    04 Jan 2020 07:01  -  05 Jan 2020 07:00  --------------------------------------------------------  IN: 480 mL / OUT: 1130 mL / NET: -650 mL        Relevant labs, radiology and Medications reviewed  < from: Xray Chest 1 View- PORTABLE-Urgent (01.04.20 @ 11:37) >    IMPRESSION:     Small left apical pneumothorax status post left chest tube.    < end of copied text >                              13.9   14.23 )-----------( 167      ( 04 Jan 2020 10:34 )             43.1     01-04    138  |  102  |  17  ----------------------------<  70  4.7   |  23  |  1.14    Ca    8.9      04 Jan 2020 10:34    TPro  7.3  /  Alb  3.9  /  TBili  0.6  /  DBili  x   /  AST  28  /  ALT  10  /  AlkPhos  52  01-04    PT/INR - ( 04 Jan 2020 10:34 )   PT: 12.7 SEC;   INR: 1.14          PTT - ( 04 Jan 2020 10:34 )  PTT:30.8 SEC  MEDICATIONS  (STANDING):  heparin  Injectable 5000 Unit(s) SubCutaneous every 8 hours  influenza   Vaccine 0.5 milliLiter(s) IntraMuscular once  pantoprazole    Tablet 40 milliGRAM(s) Oral before breakfast    MEDICATIONS  (PRN):  acetaminophen   Tablet .. 975 milliGRAM(s) Oral every 6 hours PRN Mild Pain (1 - 3)  oxyCODONE    IR 10 milliGRAM(s) Oral every 4 hours PRN Severe Pain (7 - 10)  oxyCODONE    IR 5 milliGRAM(s) Oral every 4 hours PRN Moderate Pain (4 - 6)  senna 2 Tablet(s) Oral at bedtime PRN Constipation      Assessment  45 y/o male, no significant medical history besides current every day smoker, presents from [place] ED to ED transfer for management of left pneumothorax. His tube is to -20 suction with no air leak and pain has improved since insertion. He hasn't been SOB. dizzy, or lightheaded. His labs show leukocytosis but probably elevated in setting of viral infection. His labs are otherwise within normal limits. His imaging shows small residual pneumothorax but otherwise well expanded lung from previous imaging. He is currently hemodynamically stable and afebrile.     PLAN  Neuro: Pain management with PO pain medication as needed.  Pulm: Encourage coughing, deep breathing and use of incentive spirometry. Wean off supplemental oxygen as able. Daily CXR.   Cardio: Monitor telemetry/alarms  GI: Tolerating diet. Continue stool softeners.  Renal: monitor urine output, supplement electrolytes as needed  Vasc: Heparin SC/SCDs for DVT prophylaxis  Heme: Stable H/H.   ID: Off antibiotics. Stable.  Therapy: OOB/ambulate. Chest PT, encourage coughing  Tubes: Monitor Chest tube output. Maintain chest tube in place to suction  Disposition: Maintain chest tube to -20cm suction, consider WS trial and repeat CXR. PTX appears improved. Repeat imaging in AM.  Discussed with Cardiothoracic Team at AM rounds.

## 2020-01-05 NOTE — DISCHARGE NOTE PROVIDER - NSDCFUADDINST_GEN_ALL_CORE_FT
Keep the chest tube dressings in place for 2 days.  Then remove them so that you can shower.    Avoid smoking

## 2020-01-06 VITALS
HEART RATE: 73 BPM | SYSTOLIC BLOOD PRESSURE: 114 MMHG | DIASTOLIC BLOOD PRESSURE: 69 MMHG | TEMPERATURE: 97 F | RESPIRATION RATE: 17 BRPM | OXYGEN SATURATION: 98 %

## 2020-01-06 PROBLEM — Z78.9 OTHER SPECIFIED HEALTH STATUS: Chronic | Status: ACTIVE | Noted: 2020-01-04

## 2020-01-06 LAB
ANION GAP SERPL CALC-SCNC: 14 MMO/L — SIGNIFICANT CHANGE UP (ref 7–14)
BUN SERPL-MCNC: 14 MG/DL — SIGNIFICANT CHANGE UP (ref 7–23)
CALCIUM SERPL-MCNC: 9.2 MG/DL — SIGNIFICANT CHANGE UP (ref 8.4–10.5)
CHLORIDE SERPL-SCNC: 98 MMOL/L — SIGNIFICANT CHANGE UP (ref 98–107)
CO2 SERPL-SCNC: 26 MMOL/L — SIGNIFICANT CHANGE UP (ref 22–31)
CREAT SERPL-MCNC: 1.13 MG/DL — SIGNIFICANT CHANGE UP (ref 0.5–1.3)
GLUCOSE SERPL-MCNC: 88 MG/DL — SIGNIFICANT CHANGE UP (ref 70–99)
HCT VFR BLD CALC: 43.6 % — SIGNIFICANT CHANGE UP (ref 39–50)
HGB BLD-MCNC: 14.1 G/DL — SIGNIFICANT CHANGE UP (ref 13–17)
MCHC RBC-ENTMCNC: 28.1 PG — SIGNIFICANT CHANGE UP (ref 27–34)
MCHC RBC-ENTMCNC: 32.3 % — SIGNIFICANT CHANGE UP (ref 32–36)
MCV RBC AUTO: 86.9 FL — SIGNIFICANT CHANGE UP (ref 80–100)
NRBC # FLD: 0 K/UL — SIGNIFICANT CHANGE UP (ref 0–0)
PLATELET # BLD AUTO: 150 K/UL — SIGNIFICANT CHANGE UP (ref 150–400)
PMV BLD: 13.6 FL — HIGH (ref 7–13)
POTASSIUM SERPL-MCNC: 4.1 MMOL/L — SIGNIFICANT CHANGE UP (ref 3.5–5.3)
POTASSIUM SERPL-SCNC: 4.1 MMOL/L — SIGNIFICANT CHANGE UP (ref 3.5–5.3)
RBC # BLD: 5.02 M/UL — SIGNIFICANT CHANGE UP (ref 4.2–5.8)
RBC # FLD: 13 % — SIGNIFICANT CHANGE UP (ref 10.3–14.5)
SODIUM SERPL-SCNC: 138 MMOL/L — SIGNIFICANT CHANGE UP (ref 135–145)
WBC # BLD: 8.24 K/UL — SIGNIFICANT CHANGE UP (ref 3.8–10.5)
WBC # FLD AUTO: 8.24 K/UL — SIGNIFICANT CHANGE UP (ref 3.8–10.5)

## 2020-01-06 PROCEDURE — 71045 X-RAY EXAM CHEST 1 VIEW: CPT | Mod: 26,77

## 2020-01-06 PROCEDURE — 99238 HOSP IP/OBS DSCHRG MGMT 30/<: CPT

## 2020-01-06 PROCEDURE — 71045 X-RAY EXAM CHEST 1 VIEW: CPT | Mod: 26

## 2020-01-06 RX ORDER — ACETAMINOPHEN 500 MG
2 TABLET ORAL
Qty: 30 | Refills: 0
Start: 2020-01-06 | End: 2020-01-12

## 2020-01-06 RX ORDER — DOCUSATE SODIUM 100 MG
1 CAPSULE ORAL
Qty: 21 | Refills: 0
Start: 2020-01-06 | End: 2020-01-12

## 2020-01-06 RX ORDER — OXYCODONE HYDROCHLORIDE 5 MG/1
1 TABLET ORAL
Qty: 28 | Refills: 0
Start: 2020-01-06 | End: 2020-01-12

## 2020-01-06 RX ORDER — OXYCODONE HYDROCHLORIDE 5 MG/1
1 TABLET ORAL
Qty: 20 | Refills: 0
Start: 2020-01-06 | End: 2020-01-10

## 2020-01-06 RX ORDER — OXYCODONE HYDROCHLORIDE 5 MG/1
1 TABLET ORAL
Qty: 0 | Refills: 0 | DISCHARGE

## 2020-01-06 RX ADMIN — Medication 975 MILLIGRAM(S): at 10:50

## 2020-01-06 RX ADMIN — OXYCODONE HYDROCHLORIDE 10 MILLIGRAM(S): 5 TABLET ORAL at 07:50

## 2020-01-06 RX ADMIN — Medication 975 MILLIGRAM(S): at 09:53

## 2020-01-06 RX ADMIN — OXYCODONE HYDROCHLORIDE 10 MILLIGRAM(S): 5 TABLET ORAL at 06:45

## 2020-01-06 RX ADMIN — PANTOPRAZOLE SODIUM 40 MILLIGRAM(S): 20 TABLET, DELAYED RELEASE ORAL at 05:00

## 2020-01-06 RX ADMIN — HEPARIN SODIUM 5000 UNIT(S): 5000 INJECTION INTRAVENOUS; SUBCUTANEOUS at 05:00

## 2020-01-06 NOTE — DISCHARGE NOTE NURSING/CASE MANAGEMENT/SOCIAL WORK - PATIENT PORTAL LINK FT
You can access the FollowMyHealth Patient Portal offered by [facility] by registering at the following website: [email or web address]. By joining [employer]’s FollowMyHealth portal, you will also be able to view your health information using other applications (apps) compatible with our system.

## 2020-01-06 NOTE — DISCHARGE NOTE NURSING/CASE MANAGEMENT/SOCIAL WORK - NSDCPEWEB_GEN_ALL_CORE
[facility] for Tobacco Control website --- [email or web address] website --- [email or web address]

## 2020-01-06 NOTE — DISCHARGE NOTE NURSING/CASE MANAGEMENT/SOCIAL WORK - NSDPDISTO_GEN_ALL_CORE
Home/Patient is AXOX4. Denied chest pain and shortness of breath. Vital signs remained stable. Pain was assessed and remained at an acceptable level with interventions. Incentive spirometer encouraged. Patient ambulated in hallway. Patient safety maintained through out shift. Surgical incision is d/c/i. Chest tube was removed. IV's are being removed and patient is being discharged.

## 2020-01-06 NOTE — DISCHARGE NOTE NURSING/CASE MANAGEMENT/SOCIAL WORK - NSDCPEEMAIL_GEN_ALL_CORE
[facility] for Tobacco Control email [email or web address]

## 2021-09-24 ENCOUNTER — EMERGENCY (EMERGENCY)
Facility: HOSPITAL | Age: 46
LOS: 1 days | Discharge: HOME | End: 2021-09-24
Attending: EMERGENCY MEDICINE | Admitting: EMERGENCY MEDICINE
Payer: MEDICARE

## 2021-09-24 VITALS
RESPIRATION RATE: 18 BRPM | DIASTOLIC BLOOD PRESSURE: 84 MMHG | TEMPERATURE: 98 F | SYSTOLIC BLOOD PRESSURE: 119 MMHG | OXYGEN SATURATION: 100 % | HEART RATE: 59 BPM

## 2021-09-24 VITALS
SYSTOLIC BLOOD PRESSURE: 117 MMHG | RESPIRATION RATE: 17 BRPM | DIASTOLIC BLOOD PRESSURE: 84 MMHG | HEIGHT: 76 IN | TEMPERATURE: 98 F | HEART RATE: 63 BPM | OXYGEN SATURATION: 99 %

## 2021-09-24 PROBLEM — Z78.9 OTHER SPECIFIED HEALTH STATUS: Chronic | Status: ACTIVE | Noted: 2020-01-04

## 2021-09-24 LAB
ALBUMIN SERPL ELPH-MCNC: 4.7 G/DL — SIGNIFICANT CHANGE UP (ref 3.3–5)
ALP SERPL-CCNC: 72 U/L — SIGNIFICANT CHANGE UP (ref 40–120)
ALT FLD-CCNC: 17 U/L — SIGNIFICANT CHANGE UP (ref 4–41)
ANION GAP SERPL CALC-SCNC: 12 MMOL/L — SIGNIFICANT CHANGE UP (ref 7–14)
AST SERPL-CCNC: 19 U/L — SIGNIFICANT CHANGE UP (ref 4–40)
BASOPHILS # BLD AUTO: 0.05 K/UL — SIGNIFICANT CHANGE UP (ref 0–0.2)
BASOPHILS NFR BLD AUTO: 0.7 % — SIGNIFICANT CHANGE UP (ref 0–2)
BILIRUB SERPL-MCNC: 0.5 MG/DL — SIGNIFICANT CHANGE UP (ref 0.2–1.2)
BUN SERPL-MCNC: 15 MG/DL — SIGNIFICANT CHANGE UP (ref 7–23)
CALCIUM SERPL-MCNC: 9.4 MG/DL — SIGNIFICANT CHANGE UP (ref 8.4–10.5)
CHLORIDE SERPL-SCNC: 102 MMOL/L — SIGNIFICANT CHANGE UP (ref 98–107)
CO2 SERPL-SCNC: 26 MMOL/L — SIGNIFICANT CHANGE UP (ref 22–31)
CREAT SERPL-MCNC: 1.06 MG/DL — SIGNIFICANT CHANGE UP (ref 0.5–1.3)
EOSINOPHIL # BLD AUTO: 0.16 K/UL — SIGNIFICANT CHANGE UP (ref 0–0.5)
EOSINOPHIL NFR BLD AUTO: 2.3 % — SIGNIFICANT CHANGE UP (ref 0–6)
GLUCOSE SERPL-MCNC: 74 MG/DL — SIGNIFICANT CHANGE UP (ref 70–99)
HCT VFR BLD CALC: 42.9 % — SIGNIFICANT CHANGE UP (ref 39–50)
HGB BLD-MCNC: 14.2 G/DL — SIGNIFICANT CHANGE UP (ref 13–17)
IANC: 3.5 K/UL — SIGNIFICANT CHANGE UP (ref 1.5–8.5)
IMM GRANULOCYTES NFR BLD AUTO: 0.3 % — SIGNIFICANT CHANGE UP (ref 0–1.5)
LYMPHOCYTES # BLD AUTO: 2.31 K/UL — SIGNIFICANT CHANGE UP (ref 1–3.3)
LYMPHOCYTES # BLD AUTO: 33.7 % — SIGNIFICANT CHANGE UP (ref 13–44)
MCHC RBC-ENTMCNC: 28.5 PG — SIGNIFICANT CHANGE UP (ref 27–34)
MCHC RBC-ENTMCNC: 33.1 GM/DL — SIGNIFICANT CHANGE UP (ref 32–36)
MCV RBC AUTO: 86.1 FL — SIGNIFICANT CHANGE UP (ref 80–100)
MONOCYTES # BLD AUTO: 0.81 K/UL — SIGNIFICANT CHANGE UP (ref 0–0.9)
MONOCYTES NFR BLD AUTO: 11.8 % — SIGNIFICANT CHANGE UP (ref 2–14)
NEUTROPHILS # BLD AUTO: 3.5 K/UL — SIGNIFICANT CHANGE UP (ref 1.8–7.4)
NEUTROPHILS NFR BLD AUTO: 51.2 % — SIGNIFICANT CHANGE UP (ref 43–77)
NRBC # BLD: 0 /100 WBCS — SIGNIFICANT CHANGE UP
NRBC # FLD: 0 K/UL — SIGNIFICANT CHANGE UP
PLATELET # BLD AUTO: 172 K/UL — SIGNIFICANT CHANGE UP (ref 150–400)
POTASSIUM SERPL-MCNC: 4.5 MMOL/L — SIGNIFICANT CHANGE UP (ref 3.5–5.3)
POTASSIUM SERPL-SCNC: 4.5 MMOL/L — SIGNIFICANT CHANGE UP (ref 3.5–5.3)
PROT SERPL-MCNC: 8 G/DL — SIGNIFICANT CHANGE UP (ref 6–8.3)
RBC # BLD: 4.98 M/UL — SIGNIFICANT CHANGE UP (ref 4.2–5.8)
RBC # FLD: 12.5 % — SIGNIFICANT CHANGE UP (ref 10.3–14.5)
SODIUM SERPL-SCNC: 140 MMOL/L — SIGNIFICANT CHANGE UP (ref 135–145)
WBC # BLD: 6.85 K/UL — SIGNIFICANT CHANGE UP (ref 3.8–10.5)
WBC # FLD AUTO: 6.85 K/UL — SIGNIFICANT CHANGE UP (ref 3.8–10.5)

## 2021-09-24 PROCEDURE — 99285 EMERGENCY DEPT VISIT HI MDM: CPT

## 2021-09-24 PROCEDURE — 70450 CT HEAD/BRAIN W/O DYE: CPT | Mod: 26

## 2021-09-24 RX ORDER — KETOROLAC TROMETHAMINE 30 MG/ML
15 SYRINGE (ML) INJECTION ONCE
Refills: 0 | Status: DISCONTINUED | OUTPATIENT
Start: 2021-09-24 | End: 2021-09-24

## 2021-09-24 RX ORDER — DIPHENHYDRAMINE HCL 50 MG
25 CAPSULE ORAL ONCE
Refills: 0 | Status: COMPLETED | OUTPATIENT
Start: 2021-09-24 | End: 2021-09-24

## 2021-09-24 RX ORDER — METOCLOPRAMIDE HCL 10 MG
10 TABLET ORAL ONCE
Refills: 0 | Status: COMPLETED | OUTPATIENT
Start: 2021-09-24 | End: 2021-09-24

## 2021-09-24 RX ORDER — METOCLOPRAMIDE HCL 10 MG
1 TABLET ORAL
Qty: 20 | Refills: 0
Start: 2021-09-24 | End: 2021-09-28

## 2021-09-24 RX ADMIN — Medication 15 MILLIGRAM(S): at 12:53

## 2021-09-24 RX ADMIN — Medication 15 MILLIGRAM(S): at 15:02

## 2021-09-24 RX ADMIN — Medication 10 MILLIGRAM(S): at 12:53

## 2021-09-24 RX ADMIN — Medication 25 MILLIGRAM(S): at 15:02

## 2021-09-24 NOTE — ED PROVIDER NOTE - OBJECTIVE STATEMENT
47 y/o M w/ no PMHx presents to the ED w/ 6 days of L frontal headache radiating behind his eye and to the face. Pt states pain is associated w/ photophobia and nausea but no vomiting and states 9/10 pain, but is unable to describe quality of the pain. Pt describes similar symptoms many years ago and was treated but is unsure of the diagnosis. Denies neck pain, fever, chills, sore throat, no recent travel or sick contacts. Pt is a smoker (marijuana and cigarettes) and drinks almost everyday but not yesterday. Pt denies withdrawal symptoms.

## 2021-09-24 NOTE — ED PROVIDER NOTE - CLINICAL SUMMARY MEDICAL DECISION MAKING FREE TEXT BOX
45 y/o M p/w L sided frontal headache and photophobia. Symptoms appear consistent w/ migraine. Headache has no prior diagnosis, treat as such w/ labs and head CT.

## 2021-09-24 NOTE — ED PROVIDER NOTE - PATIENT PORTAL LINK FT
You can access the FollowMyHealth Patient Portal offered by [facility] by registering at the following website: [email or web address]. By joining [employer]’s FollowMyHealth portal, you will also be able to view your health information using other applications (apps) compatible with our system.

## 2021-09-24 NOTE — ED PROVIDER NOTE - NSFOLLOWUPINSTRUCTIONS_ED_ALL_ED_FT
If headache returns, Ibuprofen 600mg every 6 to 8 hours with food.  If still having pain, may take Reglan 10mg every 8 hours.  Rest.  Return to ED if pain still now resolving, blurry vision, weakness or other concerns.   Followup with neurologist, list given.

## 2021-09-26 ENCOUNTER — EMERGENCY (EMERGENCY)
Facility: HOSPITAL | Age: 46
LOS: 1 days | Discharge: HOME | End: 2021-09-26
Admitting: EMERGENCY MEDICINE
Payer: MEDICARE

## 2021-09-26 VITALS
TEMPERATURE: 98 F | OXYGEN SATURATION: 100 % | HEIGHT: 76 IN | SYSTOLIC BLOOD PRESSURE: 149 MMHG | HEART RATE: 85 BPM | RESPIRATION RATE: 16 BRPM | DIASTOLIC BLOOD PRESSURE: 87 MMHG

## 2021-09-26 LAB
ALBUMIN SERPL ELPH-MCNC: 4.8 G/DL — SIGNIFICANT CHANGE UP (ref 3.3–5)
ALP SERPL-CCNC: 79 U/L — SIGNIFICANT CHANGE UP (ref 40–120)
ALT FLD-CCNC: 29 U/L — SIGNIFICANT CHANGE UP (ref 4–41)
ANION GAP SERPL CALC-SCNC: 11 MMOL/L — SIGNIFICANT CHANGE UP (ref 7–14)
ANISOCYTOSIS BLD QL: SLIGHT — SIGNIFICANT CHANGE UP
AST SERPL-CCNC: 33 U/L — SIGNIFICANT CHANGE UP (ref 4–40)
B PERT DNA SPEC QL NAA+PROBE: SIGNIFICANT CHANGE UP
B PERT+PARAPERT DNA PNL SPEC NAA+PROBE: SIGNIFICANT CHANGE UP
BASOPHILS # BLD AUTO: 0.24 K/UL — HIGH (ref 0–0.2)
BASOPHILS NFR BLD AUTO: 2.7 % — HIGH (ref 0–2)
BILIRUB SERPL-MCNC: 0.4 MG/DL — SIGNIFICANT CHANGE UP (ref 0.2–1.2)
BORDETELLA PARAPERTUSSIS (RAPRVP): SIGNIFICANT CHANGE UP
BUN SERPL-MCNC: 15 MG/DL — SIGNIFICANT CHANGE UP (ref 7–23)
C PNEUM DNA SPEC QL NAA+PROBE: SIGNIFICANT CHANGE UP
CALCIUM SERPL-MCNC: 9.4 MG/DL — SIGNIFICANT CHANGE UP (ref 8.4–10.5)
CHLORIDE SERPL-SCNC: 103 MMOL/L — SIGNIFICANT CHANGE UP (ref 98–107)
CO2 SERPL-SCNC: 26 MMOL/L — SIGNIFICANT CHANGE UP (ref 22–31)
CREAT SERPL-MCNC: 1.19 MG/DL — SIGNIFICANT CHANGE UP (ref 0.5–1.3)
EOSINOPHIL # BLD AUTO: 0.08 K/UL — SIGNIFICANT CHANGE UP (ref 0–0.5)
EOSINOPHIL NFR BLD AUTO: 0.9 % — SIGNIFICANT CHANGE UP (ref 0–6)
FLUAV SUBTYP SPEC NAA+PROBE: SIGNIFICANT CHANGE UP
FLUBV RNA SPEC QL NAA+PROBE: SIGNIFICANT CHANGE UP
GIANT PLATELETS BLD QL SMEAR: PRESENT — SIGNIFICANT CHANGE UP
GLUCOSE SERPL-MCNC: 92 MG/DL — SIGNIFICANT CHANGE UP (ref 70–99)
HADV DNA SPEC QL NAA+PROBE: SIGNIFICANT CHANGE UP
HCOV 229E RNA SPEC QL NAA+PROBE: SIGNIFICANT CHANGE UP
HCOV HKU1 RNA SPEC QL NAA+PROBE: SIGNIFICANT CHANGE UP
HCOV NL63 RNA SPEC QL NAA+PROBE: SIGNIFICANT CHANGE UP
HCOV OC43 RNA SPEC QL NAA+PROBE: SIGNIFICANT CHANGE UP
HCT VFR BLD CALC: 38.8 % — LOW (ref 39–50)
HGB BLD-MCNC: 12.9 G/DL — LOW (ref 13–17)
HMPV RNA SPEC QL NAA+PROBE: SIGNIFICANT CHANGE UP
HPIV1 RNA SPEC QL NAA+PROBE: SIGNIFICANT CHANGE UP
HPIV2 RNA SPEC QL NAA+PROBE: SIGNIFICANT CHANGE UP
HPIV3 RNA SPEC QL NAA+PROBE: SIGNIFICANT CHANGE UP
HPIV4 RNA SPEC QL NAA+PROBE: SIGNIFICANT CHANGE UP
IANC: 4.9 K/UL — SIGNIFICANT CHANGE UP (ref 1.5–8.5)
LYMPHOCYTES # BLD AUTO: 2.12 K/UL — SIGNIFICANT CHANGE UP (ref 1–3.3)
LYMPHOCYTES # BLD AUTO: 24.1 % — SIGNIFICANT CHANGE UP (ref 13–44)
M PNEUMO DNA SPEC QL NAA+PROBE: SIGNIFICANT CHANGE UP
MANUAL SMEAR VERIFICATION: SIGNIFICANT CHANGE UP
MCHC RBC-ENTMCNC: 28.8 PG — SIGNIFICANT CHANGE UP (ref 27–34)
MCHC RBC-ENTMCNC: 33.2 GM/DL — SIGNIFICANT CHANGE UP (ref 32–36)
MCV RBC AUTO: 86.6 FL — SIGNIFICANT CHANGE UP (ref 80–100)
MICROCYTES BLD QL: SLIGHT — SIGNIFICANT CHANGE UP
MONOCYTES # BLD AUTO: 0.54 K/UL — SIGNIFICANT CHANGE UP (ref 0–0.9)
MONOCYTES NFR BLD AUTO: 6.2 % — SIGNIFICANT CHANGE UP (ref 2–14)
NEUTROPHILS # BLD AUTO: 5.26 K/UL — SIGNIFICANT CHANGE UP (ref 1.8–7.4)
NEUTROPHILS NFR BLD AUTO: 59.8 % — SIGNIFICANT CHANGE UP (ref 43–77)
PLAT MORPH BLD: ABNORMAL
PLATELET # BLD AUTO: 164 K/UL — SIGNIFICANT CHANGE UP (ref 150–400)
PLATELET COUNT - ESTIMATE: NORMAL — SIGNIFICANT CHANGE UP
POLYCHROMASIA BLD QL SMEAR: SLIGHT — SIGNIFICANT CHANGE UP
POTASSIUM SERPL-MCNC: 4.6 MMOL/L — SIGNIFICANT CHANGE UP (ref 3.5–5.3)
POTASSIUM SERPL-SCNC: 4.6 MMOL/L — SIGNIFICANT CHANGE UP (ref 3.5–5.3)
PROT SERPL-MCNC: 7.9 G/DL — SIGNIFICANT CHANGE UP (ref 6–8.3)
RAPID RVP RESULT: SIGNIFICANT CHANGE UP
RBC # BLD: 4.48 M/UL — SIGNIFICANT CHANGE UP (ref 4.2–5.8)
RBC # FLD: 12.5 % — SIGNIFICANT CHANGE UP (ref 10.3–14.5)
RBC BLD AUTO: SIGNIFICANT CHANGE UP
RSV RNA SPEC QL NAA+PROBE: SIGNIFICANT CHANGE UP
RV+EV RNA SPEC QL NAA+PROBE: SIGNIFICANT CHANGE UP
SARS-COV-2 RNA SPEC QL NAA+PROBE: SIGNIFICANT CHANGE UP
SMUDGE CELLS # BLD: PRESENT — SIGNIFICANT CHANGE UP
SODIUM SERPL-SCNC: 140 MMOL/L — SIGNIFICANT CHANGE UP (ref 135–145)
VARIANT LYMPHS # BLD: 6.3 % — HIGH (ref 0–6)
WBC # BLD: 8.79 K/UL — SIGNIFICANT CHANGE UP (ref 3.8–10.5)
WBC # FLD AUTO: 8.79 K/UL — SIGNIFICANT CHANGE UP (ref 3.8–10.5)

## 2021-09-26 PROCEDURE — 99284 EMERGENCY DEPT VISIT MOD MDM: CPT

## 2021-09-26 RX ORDER — METOCLOPRAMIDE HCL 10 MG
10 TABLET ORAL ONCE
Refills: 0 | Status: COMPLETED | OUTPATIENT
Start: 2021-09-26 | End: 2021-09-26

## 2021-09-26 RX ORDER — KETOROLAC TROMETHAMINE 30 MG/ML
30 SYRINGE (ML) INJECTION ONCE
Refills: 0 | Status: DISCONTINUED | OUTPATIENT
Start: 2021-09-26 | End: 2021-09-26

## 2021-09-26 RX ORDER — IBUPROFEN 200 MG
1 TABLET ORAL
Qty: 25 | Refills: 0
Start: 2021-09-26

## 2021-09-26 RX ORDER — SODIUM CHLORIDE 9 MG/ML
1000 INJECTION INTRAMUSCULAR; INTRAVENOUS; SUBCUTANEOUS ONCE
Refills: 0 | Status: COMPLETED | OUTPATIENT
Start: 2021-09-26 | End: 2021-09-26

## 2021-09-26 RX ADMIN — Medication 10 MILLIGRAM(S): at 19:32

## 2021-09-26 RX ADMIN — Medication 30 MILLIGRAM(S): at 19:32

## 2021-09-26 RX ADMIN — SODIUM CHLORIDE 1000 MILLILITER(S): 9 INJECTION INTRAMUSCULAR; INTRAVENOUS; SUBCUTANEOUS at 19:32

## 2021-09-26 NOTE — ED ADULT NURSE NOTE - OBJECTIVE STATEMENT
45 y/o M received to intake room 2 c.o ha. Pt states for 2 weeks hes had intermittent ha associated with photosensitivity changes in vision and sensitivity to noise. pt states hes been taking extra strength Tylenol and Reglan as prescribed but has had no relief. Pt respirations even and unlabored. VS as noted, call bell in reach, comfort measures provided, 20 GIV placed R AC, labs drawn and sent, will continue to monitor.

## 2021-09-26 NOTE — ED PROVIDER NOTE - OBJECTIVE STATEMENT
Patient is a 45 y/o M with no sig pmhx presenting with c/o severe frontal HA x 8 days. Pain sharp pressure located to the frontal aspect of head associated with photophobia and difficulty seeing. Patient was evaluated in ED for same complaint 2 days ago, work included routine labs and CT head, either demonstrate abnormal findings. At time of discharge patient reported feeling better, rx reglan but states that HA return with same intensity. He denies any acute neuro deficits.

## 2021-09-26 NOTE — ED ADULT TRIAGE NOTE - ESI TRIAGE ACUITY LEVEL, MLM
Detail Level: Zone Text: The above diagnosis and findings were noted on the exam but not discussed at this time with the patient. 3

## 2021-09-26 NOTE — ED ADULT TRIAGE NOTE - CHIEF COMPLAINT QUOTE
Pt c/o severe headache accompanied with photosensitivity, pt presents diaphoretic and grasping at head in pain. Pt was treated and discharged here in our ED last Saturday and prescribed Reglan with no relief of symptoms.

## 2021-09-26 NOTE — ED PROVIDER NOTE - CLINICAL SUMMARY MEDICAL DECISION MAKING FREE TEXT BOX
patient presenting with frontal HA x 8 days, returning to ED for continuation of symptoms. will order routine labs provide IVF, reglan, toradol, supplemental O2, neuro consult

## 2021-09-26 NOTE — CONSULT NOTE ADULT - SUBJECTIVE AND OBJECTIVE BOX
Neurology  Consult Note  09-26-21    Name:  GEORGINA MARS; 46y (1975)    Chief Complaint: intractable headache    Patient is a 45 y/o M with no sig pmhx presenting with c/o severe frontal HA x 8 days. Pain sharp pressure located to the frontal aspect of head associated with photophobia and difficulty seeing. Patient was evaluated in ED for same complaint 2 days ago, work included routine labs and CT head, either demonstrate abnormal findings. At time of discharge patient reported feeling better, rx reglan but states that HA return with same intensity. He denies any acute neuro deficits.   (ED note 9/26)    Neurology consulted for intractable severe headache with onset approximately 8 days ago. Patient recently seen in [facility] ED 9/24 for this symptom. Per ED, patient's headache treatment included metoclopramide and ketorolac. At that time, patient's headache were alleviated, and he was discharged from the ED with prescriptions and instructions to take metoclopramide and ibuprofen. Per my discussion with patient, he took his metoclopramide, which did not alleviate his headache. He did not take his ibuprofen. Patient reported some confusion, as he asked his pharmacy which medications work for headaches, and they reportedly recommended acetaminophen, which he knows had not worked in the past.    Regarding patient's headache -- onset 8 days ago without obvious triggers, primarily left-sided ranging from the top of his scalp extending to his forehead and around his nose and eye. He c/o photophobia and phonophobia. It is exacerbated with bending forward and with cough. His vision can also become blurry. The pain is pulsatile at times, but is not stabbing pain. He had an episode of severe headache years ago, but this is worse. Patient's headaches are continuous, prevent him sleeping well, not alleviated with acetaminophen or metoclopramide. As stated above, ketorolac seemed to help while inpatient, but patient did not take ibuprofen, which was recommended at his 9/24 discharge. He has no known family medical history of migraines (he is unsure).     Review of Systems:  CONSTITUTIONAL: No weakness, fevers or chills  EYES/ENT: No vertigo; no throat pain   NECK: No stiffness  RESPIRATORY: No wheezing, no shortness of breath  CARDIOVASCULAR: No chest pain or palpitations  GASTROINTESTINAL: No abdominal or epigastric pain. No nausea, vomiting, or hematemesis.    NEUROLOGICAL: No changes in speech, hearing, swallowing, voice quality, numbness/tingling, focal weakness, balance/room-spinning sensations.       PMHx:   No pertinent past medical history    No pertinent past medical history      PFHx:     PSuHx:   No significant past surgical history    No significant past surgical history        Medications:  MEDICATIONS  (STANDING):    MEDICATIONS  (PRN):      Vitals:  T(C): 36.7 (09-26-21 @ 18:38), Max: 36.7 (09-26-21 @ 18:38)  HR: 85 (09-26-21 @ 18:38) (85 - 85)  BP: 149/87 (09-26-21 @ 18:38) (149/87 - 149/87)  RR: 16 (09-26-21 @ 18:38) (16 - 16)  SpO2: 100% (09-26-21 @ 18:38) (100% - 100%)    Physical Examination:   Head and neck: tenderness to palpation to left neck posteriorly; TTP left-side of head ranging from the top of his scalp extending to his forehead and around his nose and eye    Neurologic:  - Mental Status: Alert, awake, oriented to person, place, and time;     - Cranial Nerves:  II: Visual fields are full to confrontation; Pupils are equal, round, and reactive to light;   III, IV, VI: Extraocular movements are intact without nystagmus. No pain on EOM exam.  V: Facial sensation is intact in the V1-V3 distribution bilaterally.  VII: Face is symmetric with normal eye closure and smile.  VIII: Hearing is intact to conversation.  IX, X: Uvula is midline and soft palate rises symmetrically.  XI: Head turning and shoulder shrug are intact.  XII: Tongue protrudes in the midline.    -Motor/Strength Testing:                                 Right           Left  Deltoid                     5                 5  Biceps                      5                 5  Triceps                     5                 5    Hip Flex                   5                  5  Knee Flex                 5                  5  Knee Ext	      5                  5  Dorsiflex                  5                  5  Plantarflex               5                  5    -There is no pronator drift. Normal muscle bulk and tone throughout.    - Reflexes:   Bicep (C5/C6):                  R 2+ --- L 2+   Brachioradialis (C5/C6) :   R 2+ --- L 2+   Patella (L3/L4) :                 R 2+ --- L 2+   Ankle (S1) :                       R 2+ --- L 2+     -Plant responses down bilaterally.    - Sensory: Intact throughout to light touch   - Coordination: Finger-nose-finger intact without dysmetria.   - Gait: Deferred.    Labs:                        12.9   8.79  )-----------( 164      ( 26 Sep 2021 20:03 )             38.8     09-26    140  |  103  |  15  ----------------------------<  92  4.6   |  26  |  1.19    Ca    9.4      26 Sep 2021 20:03    TPro  7.9  /  Alb  4.8  /  TBili  0.4  /  DBili  x   /  AST  33  /  ALT  29  /  AlkPhos  79  09-26     LIVER FUNCTIONS - ( 26 Sep 2021 20:03 )  Alb: 4.8 g/dL / Pro: 7.9 g/dL / ALK PHOS: 79 U/L / ALT: 29 U/L / AST: 33 U/L / GGT: x            Radiology:  CT Head No Cont:  (24 Sep 2021 13:48)  < from: CT Head No Cont (09.24.21 @ 13:48) >  IMPRESSION:  Ventricles and sulci are unremarkable in size for age, no acute hemorrhage, mass lesions or midline shift. If symptoms persists consider follow-up head CT or MRI if there are no contraindications.    < end of copied text >

## 2021-09-26 NOTE — CONSULT NOTE ADULT - ASSESSMENT
Assessment: ***    Impression: unilateral, pulsatile, severe headache lasting for > 72 hours a/w photophobia and phonophobia, no N/V, previously alleviated with NSAIDs (ketorolac); etiology consistent with status migrainosus vs severe migraine attack.    Plan: incomplete  -IV fluids, ketorolac 30mg IVP, metoclopramide 10mg IVP  -please call Neurology #[id number] to update regarding response to regimen above  -if lack of response to regimen, would consider dihydroergotamine     46 year old male w/ no PMHx presenting with intractable severe headache with onset approximately 8 days ago. Patient initially presented to [facility] ED 9/24 for this symptom, at which time he received metoclopramide and ketorolac, w/ subsequent alleviation of HA. Discharged w/ instructions to take metoclopramide, which he took, and ibuprofen, which he did not take. N.B., acetaminophen did not alleviate symptoms. He has no known family medical history of migraines (he is unsure).     Impression: unilateral, pulsatile, severe headache lasting for > 72 hours a/w photophobia and phonophobia, no N/V, previously alleviated with NSAIDs (ketorolac); etiology consistent with status migrainosus vs severe migraine attack.    Plan:   -IV fluids, ketorolac 30mg IVP, metoclopramide 10mg IVP  -please call Neurology #[id number] to update regarding response to regimen above  -if lack of response to regimen, would consider dihydroergotamine    - UPDATE: patient's headache significantly improved on ketorolac and metoclopramide; okay to discharge home on ibuprofen and metoclopramide (as was the initial plan during 9/24/21 [facility] ED visit)    * Case and plan discussed with Neurology Attending Dr. Keating *

## 2021-09-26 NOTE — ED PROVIDER NOTE - PATIENT PORTAL LINK FT
You can access the FollowMyHealth Patient Portal offered by [facility] by registering at the following website: [email or web address]. By joining [employer]’s FollowMyHealth portal, you will also be able to view your health information using other applications (apps) compatible with our system.

## 2021-09-26 NOTE — ED PROVIDER NOTE - PROGRESS NOTE DETAILS
PA Smartt: on reassessment patient reports feeling better. patient evaluated by neuro and cleared. patient advised on symptoms instructed on using reglan with NSAIDs. neurology referral provided

## 2022-08-22 ENCOUNTER — EMERGENCY (EMERGENCY)
Facility: HOSPITAL | Age: 47
LOS: 1 days | Discharge: HOME | End: 2022-08-22
Attending: EMERGENCY MEDICINE | Admitting: EMERGENCY MEDICINE

## 2022-08-22 VITALS
TEMPERATURE: 97 F | SYSTOLIC BLOOD PRESSURE: 115 MMHG | OXYGEN SATURATION: 98 % | RESPIRATION RATE: 22 BRPM | DIASTOLIC BLOOD PRESSURE: 72 MMHG | HEART RATE: 40 BPM | HEIGHT: 76 IN

## 2022-08-22 VITALS
SYSTOLIC BLOOD PRESSURE: 128 MMHG | HEART RATE: 79 BPM | OXYGEN SATURATION: 100 % | RESPIRATION RATE: 16 BRPM | DIASTOLIC BLOOD PRESSURE: 76 MMHG

## 2022-08-22 LAB
ALBUMIN SERPL ELPH-MCNC: 4.5 G/DL — SIGNIFICANT CHANGE UP (ref 3.3–5)
ALP SERPL-CCNC: 72 U/L — SIGNIFICANT CHANGE UP (ref 40–120)
ALT FLD-CCNC: 24 U/L — SIGNIFICANT CHANGE UP (ref 4–41)
ANION GAP SERPL CALC-SCNC: 9 MMOL/L — SIGNIFICANT CHANGE UP (ref 7–14)
AST SERPL-CCNC: 18 U/L — SIGNIFICANT CHANGE UP (ref 4–40)
BILIRUB SERPL-MCNC: 0.2 MG/DL — SIGNIFICANT CHANGE UP (ref 0.2–1.2)
BUN SERPL-MCNC: 19 MG/DL — SIGNIFICANT CHANGE UP (ref 7–23)
CALCIUM SERPL-MCNC: 9.2 MG/DL — SIGNIFICANT CHANGE UP (ref 8.4–10.5)
CHLORIDE SERPL-SCNC: 104 MMOL/L — SIGNIFICANT CHANGE UP (ref 98–107)
CO2 SERPL-SCNC: 28 MMOL/L — SIGNIFICANT CHANGE UP (ref 22–31)
CREAT SERPL-MCNC: 1.14 MG/DL — SIGNIFICANT CHANGE UP (ref 0.5–1.3)
EGFR: 80 ML/MIN/1.73M2 — SIGNIFICANT CHANGE UP
GLUCOSE SERPL-MCNC: 79 MG/DL — SIGNIFICANT CHANGE UP (ref 70–99)
HCT VFR BLD CALC: 38.2 % — LOW (ref 39–50)
HGB BLD-MCNC: 12.4 G/DL — LOW (ref 13–17)
IANC: 3.78 K/UL — SIGNIFICANT CHANGE UP (ref 1.8–7.4)
MCHC RBC-ENTMCNC: 28.4 PG — SIGNIFICANT CHANGE UP (ref 27–34)
MCHC RBC-ENTMCNC: 32.5 GM/DL — SIGNIFICANT CHANGE UP (ref 32–36)
MCV RBC AUTO: 87.6 FL — SIGNIFICANT CHANGE UP (ref 80–100)
PLATELET # BLD AUTO: 120 K/UL — LOW (ref 150–400)
POTASSIUM SERPL-MCNC: 4.7 MMOL/L — SIGNIFICANT CHANGE UP (ref 3.5–5.3)
POTASSIUM SERPL-SCNC: 4.7 MMOL/L — SIGNIFICANT CHANGE UP (ref 3.5–5.3)
PROT SERPL-MCNC: 7.6 G/DL — SIGNIFICANT CHANGE UP (ref 6–8.3)
RBC # BLD: 4.36 M/UL — SIGNIFICANT CHANGE UP (ref 4.2–5.8)
RBC # FLD: 12.5 % — SIGNIFICANT CHANGE UP (ref 10.3–14.5)
SODIUM SERPL-SCNC: 141 MMOL/L — SIGNIFICANT CHANGE UP (ref 135–145)
TROPONIN T, HIGH SENSITIVITY RESULT: 7 NG/L — SIGNIFICANT CHANGE UP
WBC # BLD: 8.22 K/UL — SIGNIFICANT CHANGE UP (ref 3.8–10.5)
WBC # FLD AUTO: 8.22 K/UL — SIGNIFICANT CHANGE UP (ref 3.8–10.5)

## 2022-08-22 PROCEDURE — 71046 X-RAY EXAM CHEST 2 VIEWS: CPT | Mod: 26

## 2022-08-22 PROCEDURE — 99285 EMERGENCY DEPT VISIT HI MDM: CPT | Mod: 25

## 2022-08-22 PROCEDURE — 93010 ELECTROCARDIOGRAM REPORT: CPT

## 2022-08-22 RX ORDER — ACETAMINOPHEN 500 MG
975 TABLET ORAL ONCE
Refills: 0 | Status: COMPLETED | OUTPATIENT
Start: 2022-08-22 | End: 2022-08-22

## 2022-08-22 RX ADMIN — Medication 975 MILLIGRAM(S): at 23:06

## 2022-08-22 NOTE — ED PROVIDER NOTE - OBJECTIVE STATEMENT
46yo M no medical problems except previous L PTX suspected in setting of viral illness, daily smoker here for recent L ptx. Pt was in [place] (the country), 2 weeks ago w/ L ptx s/p chest tube. Hes here today because he just flew back from [place] yesterday despite being told he should not fly. Prior to departure he was told that he would need surgery for something in his chest found on CT scan. Pt has some mild sternal cp and cp at site of his Chest tube sutures which is all unchanged since 2 weeks ago. Denies fever, sob, abdominal pain, change in urine or bowel.

## 2022-08-22 NOTE — ED PROVIDER NOTE - PHYSICAL EXAMINATION
General: NAD  HEENT: NCAT  Cardiac: RRR, 2+ radial pulses  Chest: CTA but bilaterally diminished +L chest slightly tender at the site of sutures.  Abdomen: soft, non-distended, no ttp, no rebound or guarding  Extremities: no peripheral edema, calf tenderness, or leg size discrepancies  Skin: no rashes  Neuro: AAOx3, motor and sensory grossly intact  Psych: mood and affect appropriate

## 2022-08-22 NOTE — ED PROVIDER NOTE - ATTENDING CONTRIBUTION TO CARE
Dr. Harden:  I have personally performed a face to face bedside history and physical examination of this patient. I have discussed the history, examination, review of systems, assessment and plan of management with the resident. I have reviewed the electronic medical record and amended it to reflect my history, review of systems, physical exam, assessment and plan.    47M current smoker, h/o remote L pneumothorax, presents with a recurrent L pneumothorax.  Pt was in [place] and 2 weeks ago, developed SOB, found to have another L pneumothorax.  Had a chest tube placed there, since removed.  Flew here from [place] today essentially to set up care.  Pt was told by doctor in [place] that he would need surgery for something found on CT there.      Exam:   -nad  - rrr  - ctab  - abd soft ntnd    A/P  - recent ptx, eval current situation  - basic labs, CT chest

## 2022-08-22 NOTE — ED PROVIDER NOTE - PROGRESS NOTE DETAILS
DARREN Vazquez (PGY-3) - pt w/ biapical blebs on CT, no ptx, will dc w/ thoracic surgery follow up. Strict return precautions given.

## 2022-08-22 NOTE — ED PROVIDER NOTE - CLINICAL SUMMARY MEDICAL DECISION MAKING FREE TEXT BOX
Pt w/ L ptx s/p chest tube here for check up for his chest which he was told needed surgery. VSS. Exam w bilaterally diminished breath sounds. Concern for ptx recurrence. Will obtain CT chest, CXr, ekg, reassess.

## 2022-08-22 NOTE — ED PROVIDER NOTE - PATIENT PORTAL LINK FT
You can access the FollowMyHealth Patient Portal offered by [facility] by registering at the following website: [email or web address]. By joining [employer]’s FollowMyHealth portal, you will also be able to view your health information using other applications (apps) compatible with our system.

## 2022-08-22 NOTE — ED ADULT NURSE NOTE - OBJECTIVE STATEMENT
Pt present to ED A&04 ambulatory at baseline, coming in complaining of non radiating chest pain, and increased SOB x1 day. Patient had left pneumothorax s/p chest tube in [place] 2 weeks ago. Presents to the ED for worsening SOB. Patient is a daily smoker, 5 packs a day. Respirations even and unlabored. Lung sounds clear with equal chest rise bilaterally. ABD is soft, non tender, non distended with normal active bowel sounds No complaints of  headache, nausea, dizziness, vomiting  fever, chills verbalized. 20GLAC in place, NSR on cardiac monitor, sating 100% on room air. labs sent awaiting XR

## 2022-08-22 NOTE — ED ADULT NURSE NOTE - CHIEF COMPLAINT QUOTE
Patient arrives s/p chest tube in [facility] 2 wks PTA for "collapsed lung." States he required surgery, did not get it, left country for better care. Pt arrives with CP and SOB. Noted to be bradycardic. PMHx collapsed lung.

## 2022-08-22 NOTE — ED PROVIDER NOTE - NSFOLLOWUPINSTRUCTIONS_ED_ALL_ED_FT
You were seen in the Emergency Department for lung check up after a pneumothorax. You were found to have some lung blebs, areas that are more susceptible to popping. As discussed follow up with thoracic surgery.       1) Continue all previously prescribed medications as directed.    2) Follow up with your primary care physician - take copies of your results.    3) Return to the Emergency Department for worsening or persistent symptoms, and/or ANY NEW OR CONCERNING SYMPTOMS.

## 2022-08-22 NOTE — ED ADULT TRIAGE NOTE - CHIEF COMPLAINT QUOTE
Patient arrives s/p chest tube in [facility] 2 wks PTA for "collapsed lung." States he required surgery, left country for better care. Pt arrives with CP and SOB. Noted to be bradycardic. PMHx collapsed lung. Patient arrives s/p chest tube in [facility] 2 wks PTA for "collapsed lung." States he required surgery, did not get it, left country for better care. Pt arrives with CP and SOB. Noted to be bradycardic. PMHx collapsed lung.

## 2022-08-22 NOTE — ED ADULT NURSE NOTE - NSIMPLEMENTINTERV_GEN_ALL_ED
Implemented All Universal Safety Interventions:  [place] to call system. Call bell, personal items and telephone within reach. Instruct patient to call for assistance. Room bathroom lighting operational. Non-slip footwear when patient is off stretcher. Physically safe environment: no spills, clutter or unnecessary equipment. Stretcher in lowest position, wheels locked, appropriate side rails in place.

## 2022-08-23 LAB
BASOPHILS # BLD AUTO: 0.08 K/UL — SIGNIFICANT CHANGE UP (ref 0–0.2)
BASOPHILS NFR BLD AUTO: 1 % — SIGNIFICANT CHANGE UP (ref 0–2)
EOSINOPHIL # BLD AUTO: 0.24 K/UL — SIGNIFICANT CHANGE UP (ref 0–0.5)
EOSINOPHIL NFR BLD AUTO: 2.9 % — SIGNIFICANT CHANGE UP (ref 0–6)
IMM GRANULOCYTES NFR BLD AUTO: 0.2 % — SIGNIFICANT CHANGE UP (ref 0–1.5)
LYMPHOCYTES # BLD AUTO: 3.07 K/UL — SIGNIFICANT CHANGE UP (ref 1–3.3)
LYMPHOCYTES # BLD AUTO: 37.3 % — SIGNIFICANT CHANGE UP (ref 13–44)
MONOCYTES # BLD AUTO: 1.03 K/UL — HIGH (ref 0–0.9)
MONOCYTES NFR BLD AUTO: 12.5 % — SIGNIFICANT CHANGE UP (ref 2–14)
NEUTROPHILS # BLD AUTO: 3.78 K/UL — SIGNIFICANT CHANGE UP (ref 1.8–7.4)
NEUTROPHILS NFR BLD AUTO: 46.1 % — SIGNIFICANT CHANGE UP (ref 43–77)
NRBC # BLD: 0 /100 WBCS — SIGNIFICANT CHANGE UP (ref 0–0)
NRBC # FLD: 0.02 K/UL — HIGH (ref 0–0)

## 2022-08-23 PROCEDURE — 71250 CT THORAX DX C-: CPT | Mod: 26,MA

## 2022-08-23 NOTE — ED ADULT NURSE REASSESSMENT NOTE - NS ED NURSE REASSESS COMMENT FT1
Break RN note- patient resting quietly in bed, breathing even and nonlabored. No acute distress. Patient denies any SOB after walking to the bathroom. Patient appears comfortable. Cardiac monitor in place- sinus rhythm. Awaiting CT scan. Safety maintained. Patient stable upon exiting the room.

## 2024-06-05 NOTE — ED ADULT NURSE NOTE - CADM POA CENTRAL LINE
-Hold Amlodipine and Enalapril given high risk for spesis and pending OR intervention by [facility]   -VS q4h  -c/w Labetalol No

## 2025-01-12 NOTE — ED PROVIDER NOTE - CONDITION AT DISCHARGE:
FREE:[LAST:[robert],FIRST:[pato],PHONE:[phone]],FAX:[(   )    -],FOLLOWUP:[1-3 Days]]
Improved

## 2025-04-18 NOTE — H&P ADULT - NSHPPOACENTRALVENOUSCATHETER_GEN_ALL_CORE
Specialty Pharmacy Patient Management Program  Prescription Refill Request     Patient currently fills medications at  Pharmacy. Needing refill(s) on the following:      Requested Prescriptions     Pending Prescriptions Disp Refills    SITagliptin (JANUVIA) 100 MG tablet 90 tablet 1     Sig: Take 1 tablet by mouth Daily.    dapagliflozin Propanediol (Farxiga) 10 MG tablet 90 tablet 1     Sig: Take 1 tablet by mouth Daily.       Last visit: 03/07/25      Next visit: 07/18/25    Pended for RAYO Baldwin to review, and approve if appropriate.       Mary Rae, PharmD, BCACP, BC-ADM, CDC  Clinical Specialty Pharmacist, Endocrinology  4/18/2025  14:28 EDT     no